# Patient Record
Sex: MALE | Race: WHITE
[De-identification: names, ages, dates, MRNs, and addresses within clinical notes are randomized per-mention and may not be internally consistent; named-entity substitution may affect disease eponyms.]

---

## 2017-01-14 ENCOUNTER — HOSPITAL ENCOUNTER (EMERGENCY)
Dept: HOSPITAL 76 - ED | Age: 72
Discharge: HOME | End: 2017-01-14
Payer: COMMERCIAL

## 2017-01-14 VITALS — DIASTOLIC BLOOD PRESSURE: 83 MMHG | SYSTOLIC BLOOD PRESSURE: 125 MMHG

## 2017-01-14 DIAGNOSIS — H66.002: Primary | ICD-10-CM

## 2017-01-14 PROCEDURE — 99283 EMERGENCY DEPT VISIT LOW MDM: CPT

## 2017-01-14 RX ADMIN — DEXAMETHASONE SODIUM PHOSPHATE STA MG: 10 INJECTION, SOLUTION INTRAMUSCULAR; INTRAVENOUS at 10:10

## 2017-01-14 NOTE — ED PHYSICIAN DOCUMENTATION
PD HPI URI





- Stated complaint


Stated Complaint: SINUS PRESSURE/CONGESTION





- Chief complaint


Chief Complaint: Resp





- History obtained from


History obtained from: Patient





- History of Present Illness


Timing - onset: Yesterday


Timing duration: Days (1)


Timing details: Gradual onset, Still present


Associated symptoms: Nasal congestion, Rhinorrhea, Sinus pain, Dry cough


Worsened by: Activity


Similar symptoms before: Diagnosis (pneumonia)


Recently seen: Clinic (The patient was treated for pneumonia last month and 

this resolved.)





- Additional information


Additional information: 





70 y/o male with a recent history of pneumonia has developed symptoms of sinus 

congestion and cough similar to the way the pneumonia started last month. He 

wants to treat this before he gets sick. He has not had a fever with this. He 

does have congestion and sinus pressure with the cough. 





Review of Systems


Constitutional: denies: Fever, Chills


Eyes: denies: Decreased vision


Ears: denies: Ear pain


Nose: reports: Rhinorrhea / runny nose, Congestion, Sinus pressure / pain


Throat: denies: Sore throat


Cardiac: denies: Chest pain / pressure, Palpitations


Respiratory: reports: Cough.  denies: Dyspnea


GI: denies: Nausea, Vomiting


: denies: Dysuria





PD PAST MEDICAL HISTORY





- Present Medications


Home Medications: 


 Ambulatory Orders











 Medication  Instructions  Recorded  Confirmed


 


Amoxicillin/Potassium Clav 1 each PO BID #20 tablet 01/14/17 





[Augmentin 875-125 Tablet]   


 


Aspirin 81 mg PO 01/14/17 01/14/17


 


Clopidogrel Bisulfate [Plavix] 75 mg PO 01/14/17 


 


Metoprolol Succinate [Toprol Xl] 25 mg PO ONCE 01/14/17 01/14/17


 


Simvastatin 10 mg PO 01/14/17 


 


Tamsulosin HCl [Flomax] 0.4 mg PO 01/14/17 


 


Valsartan 160 mg PO 01/14/17 


 


metFORMIN [Glucophage] 500 mg PO BIDWM 01/14/17 01/14/17














- Allergies


Allergies/Adverse Reactions: 


 Allergies











Allergy/AdvReac Type Severity Reaction Status Date / Time


 


No Known Drug Allergies Allergy   Verified 01/14/17 09:57














PD ED PE NORMAL





- Vitals


Vital signs reviewed: Yes (normal )





- General


General: No acute distress, Well developed/nourished





- HEENT


HEENT: Atraumatic, PERRL, EOMI, Other (The left TM is inflammed and the right 

is clear. There is no tenderness to palpation of the maxillary sinuses. )





- Neck


Neck: Supple, no meningeal sign, No bony TTP





- Cardiac


Cardiac: RRR, No murmur





- Respiratory


Respiratory: No respiratory distress, Clear bilaterally





- Abdomen


Abdomen: Soft, Non tender





- Back


Back: No CVA TTP





- Derm


Derm: Normal color, Warm and dry, No rash





- Extremities


Extremities: No deformity, No edema





- Neuro


Neuro: No motor deficit, No sensory deficit





- Psych


Psych: Normal mood, Normal affect





Results





- Vitals


Vitals: 





 Vital Signs - 24 hr











  01/14/17





  09:54


 


Temperature 37.2 C


 


Heart Rate 93


 


Respiratory 24





Rate 


 


Blood Pressure 125/83 H


 


O2 Saturation 99








 Oxygen











O2 Source                      Room air

















PD MEDICAL DECISION MAKING





- ED course


Complexity details: reviewed old records, considered differential, d/w patient


ED course: 





70 y/o male with recent pneumonia has developed a cough and nasal congestion 

again. He has OM on exam and treatment is administered with decadron 10mg PO 

and we will switch the antibiotic to augmentin. 





Departure





- Departure


Disposition: 01 Home, Self Care


Clinical Impression: 


Otitis media


Qualifiers:


 Otitis media type: suppurative Laterality: left Chronicity: acute Recurrence: 

not specified as recurrent Spontaneous tympanic membrane rupture: without 

spontaneous rupture Qualified Code(s): H66.002 - Acute suppurative otitis media 

without spontaneous rupture of ear drum, left ear


Condition: Stable


Instructions:  ED Otitis Media Abx Tx


Follow-Up: 


Mirna Ji ARNP [Primary Care Provider] - 


Prescriptions: 


Amoxicillin/Potassium Clav [Augmentin 875-125 Tablet] 1 each PO BID #20 tablet

## 2017-01-24 ENCOUNTER — HOSPITAL ENCOUNTER (OUTPATIENT)
Age: 72
Discharge: HOME | End: 2017-01-24
Payer: MEDICARE

## 2017-01-24 DIAGNOSIS — R06.00: ICD-10-CM

## 2017-01-24 DIAGNOSIS — R05: Primary | ICD-10-CM

## 2017-01-24 DIAGNOSIS — J18.9: ICD-10-CM

## 2017-12-21 ENCOUNTER — HOSPITAL ENCOUNTER (OUTPATIENT)
Dept: HOSPITAL 76 - DI.S | Age: 72
Discharge: HOME | End: 2017-12-21
Attending: NURSE PRACTITIONER
Payer: MEDICARE

## 2017-12-21 DIAGNOSIS — M17.12: ICD-10-CM

## 2017-12-21 DIAGNOSIS — M25.562: Primary | ICD-10-CM

## 2017-12-22 ENCOUNTER — HOSPITAL ENCOUNTER (EMERGENCY)
Dept: HOSPITAL 76 - ED | Age: 72
Discharge: HOME | End: 2017-12-22
Payer: MEDICARE

## 2017-12-22 VITALS — SYSTOLIC BLOOD PRESSURE: 144 MMHG | DIASTOLIC BLOOD PRESSURE: 81 MMHG

## 2017-12-22 DIAGNOSIS — E78.00: ICD-10-CM

## 2017-12-22 DIAGNOSIS — Z79.84: ICD-10-CM

## 2017-12-22 DIAGNOSIS — I10: ICD-10-CM

## 2017-12-22 DIAGNOSIS — M25.562: Primary | ICD-10-CM

## 2017-12-22 DIAGNOSIS — J84.10: ICD-10-CM

## 2017-12-22 DIAGNOSIS — Z79.82: ICD-10-CM

## 2017-12-22 DIAGNOSIS — E11.9: ICD-10-CM

## 2017-12-22 DIAGNOSIS — I25.2: ICD-10-CM

## 2017-12-22 PROCEDURE — 99283 EMERGENCY DEPT VISIT LOW MDM: CPT

## 2017-12-22 PROCEDURE — 29530 STRAPPING OF KNEE: CPT

## 2017-12-22 NOTE — XRAY REPORT
THREE VIEW LEFT KNEE:  12/21/2017

 

CLINICAL INDICATION:  Pain, stiffness, popping.

 

COMPARISON:  10/29/2014

 

AP, lateral, sunrise views of the left knee demonstrate mild osteoarthritis, 
stable.  There is no evidence of interval fracture or dislocation.  No effusion 
is seen.

 

IMPRESSION:  Stable mild osteoarthritis.



DD: 12/21/2017 14:25

TD: 12/21/2017 19:06

Job #: [id number]

[facility]EN[facility]

## 2018-07-20 ENCOUNTER — HOSPITAL ENCOUNTER (OUTPATIENT)
Dept: HOSPITAL 76 - DI.S | Age: 73
Discharge: HOME | End: 2018-07-20
Attending: NURSE PRACTITIONER
Payer: MEDICARE

## 2018-07-20 DIAGNOSIS — J84.10: Primary | ICD-10-CM

## 2018-07-20 PROCEDURE — 71046 X-RAY EXAM CHEST 2 VIEWS: CPT

## 2018-07-20 NOTE — XRAY REPORT
Procedure Date:  07/20/2018   

Accession Number:  [id number] / [id number]                    

Procedure:  XRS - Chest 2 View X-Ray CPT Code:  13895

 

FULL RESULT:

 

 

EXAM: Chest 2 View X-Ray

 

DATE: 7/20/2018 1:45 PM

 

CLINICAL HISTORY: CHRONIC FIBROSIS

 

COMPARISON: 1/24/2017, 12/29/2016, 12/21/2016.

 

TECHNIQUE: 2 views.

 

FINDINGS:

Lungs/Pleura: Focal opacification in the lateral right chest at the level 

of the horizontal fissure, decreased in conspicuity compared to 2016. No 

reticular markings and no lung volume loss are identified to suggest 

significant progression of fibrotic disease. There is no lung 

consolidation. There is no pleural thickening, pleural effusion or 

pneumothorax.

 

Mediastinum: Heart and mediastinal contours are stable.

 

Other: None.

 

IMPRESSION:

Overall stable chest radiograph without significant abnormalities.

 

Unless the diagnosis of fibrotic lung disease is well established and 

categorized, recommend formal high-resolution chest CT in consultation 

with pulmonology.

 

[facility]

## 2019-01-23 NOTE — ED PHYSICIAN DOCUMENTATION
PD HPI LOWER EXT INJURY





- Stated complaint


Stated Complaint: LT KNEE PX





- Chief complaint


Chief Complaint: Ext Problem





- History obtained from


History obtained from: Patient





- History of Present Illness


PD HPI LOW EXT INJURY LOCATION: Left, Knee


Type of injury: Other (No specific injury.)


Timing - onset: How many weeks ago (1)


Timing - details: Waxing and waning


Worsened by: Moving, Other (Bending knee.)


Associated symptoms: No: Weakness, Numbness


Recently seen: Clinic (Yesterday.)





- Additional information


Additional information: 


The patient is a 72-year-old male who presents with pain in his left knee.  The 

pain has been waxing and waning for the past week, without any known specific 

injury.  He was seen by his primary physician yesterday, and an x-ray revealed 

"stable mild osteoarthritis."  His pain became worse this morning when his knee 

"popped" when stepping into his truck.  He reports history of similar symptoms 

a few months ago.  His symptoms resolved spontaneously at that time.








Review of Systems


Constitutional: denies: Fever


Respiratory: denies: Dyspnea


Skin: denies: Rash


Musculoskeletal: reports: Joint pain (Left knee.).  denies: Back pain


Neurologic: denies: Focal weakness, Numbness





PD PAST MEDICAL HISTORY





- Past Medical History


Past Medical History: Yes


Cardiovascular: Hypertension, High cholesterol, MI


Respiratory: Pneumonia, Other


Endocrine/Autoimmune: Type 2 diabetes


: Benign prostate hypertrophy


Other Past Medical History: Idiopathic pulmonary fibrosis.





- Past Surgical History


Past Surgical History: No





- Present Medications


Home Medications: 


 Ambulatory Orders











 Medication  Instructions  Recorded  Confirmed


 


Aspirin 81 mg PO DAILY 01/14/17 12/22/17


 


Clopidogrel Bisulfate [Plavix] 75 mg PO DAILY 01/14/17 12/22/17


 


Metoprolol Succinate [Toprol Xl] 25 mg PO ONCE 01/14/17 12/22/17


 


Simvastatin 40 mg PO DAILY 01/14/17 12/22/17


 


Tamsulosin HCl [Flomax] 0.4 mg PO DAILY 01/14/17 12/22/17


 


Valsartan 160 mg PO DAILY 01/14/17 12/22/17


 


metFORMIN [Glucophage] 500 mg PO DAILY 01/14/17 12/22/17














- Allergies


Allergies/Adverse Reactions: 


 Allergies











Allergy/AdvReac Type Severity Reaction Status Date / Time


 


No Known Drug Allergies Allergy   Verified 12/22/17 12:33














- Social History


Does the pt smoke?: No


Smoking Status: Never smoker


Does the pt drink ETOH?: No


Does the pt have substance abuse?: No





- Immunizations


Immunizations are current?: Yes





- POLST


Patient has POLST: No





PD ED PE NORMAL





- Vitals


Vital signs reviewed: Yes (Borderline hypertension.)





- General


General: Alert and oriented X 3, Well developed/nourished





- HEENT


HEENT: Atraumatic





- Respiratory


Respiratory: No respiratory distress





- Derm


Derm: No rash





- Extremities


Extremities: No edema, No calf tenderness / cord, Other (There is very slight 

effusion of the left knee, without focal tenderness to palpation along the 

medial or lateral joint lines, popliteal fossa, or suprapatellar region.  There 

is no erythema, and no warmth to palpation.  There is no ligamentous 

instability detected.  Distal neurovascular is intact.  He can fully extend, 

and can flex to 90, although flexion exacerbates his discomfort.)





- Neuro


Neuro: Alert and oriented X 3, No motor deficit, No sensory deficit





Results





- Vitals


Vitals: 


 Oxygen











O2 Source                      Room air

















PD MEDICAL DECISION MAKING





- ED course


Complexity details: reviewed old records, considered differential, d/w patient, 

d/w family


ED course: 





The patient's presentation is most consistent with internal derangement of the 

left knee, most likely a meniscus tear.  I reviewed the x-ray that was taken 

yesterday, and agree with the radiologist's findings.  I do not see any 

clinical indication for repeating imaging studies today.  His presentation does 

not suggest septic joint or acute bony injury.


Treatment in the emergency department included application of a knee immobilizer

, and administration of acetaminophen 650 mg orally.  I discussed with him and 

his wife symptomatic treatment, outpatient follow-up, as well as potentially 

worrisome signs or symptoms that should prompt reevaluation is needed.





Departure





- Departure


Disposition: 01 Home, Self Care


Clinical Impression: 


Left knee pain


Qualifiers:


 Chronicity: acute Qualified Code(s): M25.562 - Pain in left knee





Knee internal derangement


Qualifiers:


 Laterality: left Qualified Code(s): M23.92 - Unspecified internal derangement 

of left knee





Condition: Stable


Instructions:  ED Meniscal Injury Knee Poss


Follow-Up: 


River Jacinto MD [Primary Care Provider] - 


Comments: 


Use the knee immobilizer if it provides comfort.


Continue using Tylenol as needed for pain.


Follow-up with your primary physician, or with orthopedics within 2 weeks.  

Call to schedule an appointment.


Return to the emergency department if you develop increasing pain, increasing 

swelling of your knee, or otherwise worsening symptoms.


Discharge Date/Time: 12/22/17 13:40
no abrasions, no jaundice, no lesions, no pruritis, and no rashes.